# Patient Record
Sex: MALE | Race: WHITE | URBAN - METROPOLITAN AREA
[De-identification: names, ages, dates, MRNs, and addresses within clinical notes are randomized per-mention and may not be internally consistent; named-entity substitution may affect disease eponyms.]

---

## 2017-02-24 ENCOUNTER — TELEPHONE (OUTPATIENT)
Dept: OTHER | Facility: CLINIC | Age: 67
End: 2017-02-24

## 2017-02-24 NOTE — TELEPHONE ENCOUNTER
I tried to call Pal Stephens Today.  He undergone a left common femoral to mid posterior tibial bypass graft for ischemic toes.  Surgery was performed on 6/27/2015.  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  Last follow-up in December 2015 revealed a bypass graft be widely patent.  He is scheduled for a follow-up appointment in his miss this despite several calls.    I reinforced the necessity of following his bypass graft.  I have left her number so he can call and schedule an appointment.       Xander Lane MD